# Patient Record
(demographics unavailable — no encounter records)

---

## 2017-09-01 NOTE — XR
EXAMINATION TYPE: XR chest 2V

 

DATE OF EXAM: 9/1/2017

 

COMPARISON: 11/15/2016

 

INDICATION: Pneumonia, cough

 

TECHNIQUE:  Frontal and lateral views of the chest are obtained.

 

FINDINGS:  

The heart size is normal.  

The pulmonary vasculature is normal.

The lungs are clear.

 

IMPRESSION:  

1. No acute pulmonary process.

## 2018-01-05 NOTE — ED
General Adult HPI





- General


Chief complaint: Upper Respiratory Infection


Stated complaint: Fever, nauseated


Time Seen by Provider: 18 09:34


Source: patient, RN notes reviewed


Mode of arrival: ambulatory


Limitations: no limitations





- History of Present Illness


Initial comments: 





31 yo female presents to the ER with cc of cough cold like symptoms.  She 

states she's been sick since yesterday.  She denies a high fever.  She states 

she's had a lot of congestion and drainage.  She states that she was concerned 

because she just is not feeling much better so she thought that she should be 

evaluated.  She denies any vomiting with this. Patient denies any recent 

shortness of breath, chest pain, back pain, abdominal pain, vomiting, numbness 

or tingling, dysuria or hematuria, constipation or diarrhea, headaches or 

visual changes, or any other current symptoms.





- Related Data


 Home Medications











 Medication  Instructions  Recorded  Confirmed


 


Acetaminophen [Tylenol] 650 mg PO Q6H PRN 18


 


Albuterol Inhaler [Ventolin Hfa 1 - 2 puff INHALATION RT-QID PRN 18





Inhaler]   


 


Azithromycin [Zithromax Z-pack] 0 mg PO AS DIRECTED 18


 


Bisoprolol-Hctz 5-6.25 mg [Ziac 1 tab PO DAILY 18





5-6.25]   


 


Montelukast [Singulair] 10 mg PO HS 18











 Allergies











Allergy/AdvReac Type Severity Reaction Status Date / Time


 


No Known Allergies Allergy   Verified 18 10:00














Review of Systems


ROS Statement: 


Those systems with pertinent positive or pertinent negative responses have been 

documented in the HPI.





ROS Other: All systems not noted in ROS Statement are negative.





Past Medical History


Past Medical History: No Reported History, Hypertension, Supraventricular 

Tachycardia (SVT)


Additional Past Medical History / Comment(s): tachycardia, svt


History of Any Multi-Drug Resistant Organisms: None Reported


Past Surgical History:  Section


Past Anesthesia/Blood Transfusion Reactions: No Reported Reaction


Past Psychological History: No Psychological Hx Reported


Smoking Status: Former smoker


Past Alcohol Use History: Occasional


Past Drug Use History: None Reported





- Past Family History


  ** Mother


Family Medical History: No Reported History





General Exam





- General Exam Comments


Initial Comments: 





General exam: Alert, active, comfortable in no apparent distress


Head: Normocephalic 


Eyes: Normal reaction of pupils, equal size, normal range of extraocular motion


Ears: normal external ear canals, pink tympanic membranes with normal cone of 

light


Nose: clear with pink turbinates


Throat: no erythema or exudates with normal sized tonsils


Neck: no masses, no nuchal rigidity


Chest: no chest wall deformity


Lungs: equal air entry with no crackles or wheeze


CVS: S1 and S2 normal with no audible mumurs, regular rhythm


Abdomen: no hepatosplenomegaly, normal  bowel sounds, no guarding or rigidity


Spine: no scoliosis or deformity


Skin: no rashes


Neurological: No focal deficits, tone is normal in all 4 extremities


Limitations: no limitations





Course


 Vital Signs











  18





  09:28 09:50 10:04


 


Temperature 97.4 F L  98.8 F


 


Pulse Rate 114 H  


 


Respiratory 18 20 





Rate   


 


Blood Pressure 143/77  


 


O2 Sat by Pulse 100  





Oximetry   














Medical Decision Making





- Medical Decision Making





32-year-old female presents to the emergency department with a chief complaint 

of cough cold like symptoms.  At this time the patient is positive for 

influenza A.  At this time we we'll give her prescription for Tamiflu.  We 

discussed Motrin and Tylenol.  We discussed return parameters and follow-up and 

all questions.  Patient stated that she understood and she is agreement plan.  

All cushions have been answered.  She'll be discharged.





- Lab Data


 Lab Results











  18 Range/Units





  09:55 09:55 


 


Influenza Type A RNA  Detected H   (Not Detectd)  


 


Influenza Type B (PCR)  Not Detected   (Not Detectd)  


 


Group A Strep Rapid   Negative  (Negative)  














- Radiology Data


Radiology results: report reviewed, image reviewed





Disposition


Clinical Impression: 


 Influenza A





Disposition: HOME SELF-CARE


Condition: Stable


Instructions:  Influenza in Children (ED)


Additional Instructions: 


Please use medication as discussed. Please follow up with family doctor if 

symptoms have not improved over the next two days. Please return to the 

emergency room if your symptoms increase or worsen or for any other concerns. 


Referrals: 


Alexey Sun MD [Primary Care Provider] - 1-2 days


Time of Disposition: 10:38

## 2018-03-04 NOTE — ED
Lower Extremity Injury HPI





- General


Stated Complaint: left ankle injury


Time Seen by Provider: 18 14:54


Source: patient, RN notes reviewed


Mode of arrival: wheelchair


Limitations: no limitations





- History of Present Illness


Initial Comments: 





33-year-old female presents emergency Department chief complaint of left ankle 

injury.  Patient states that she was getting off a trampoline states her ankle 

rolled she felt a snap.  Patient has pain in her tib-fib, left ankle region.  

Patient said no prior fractures.  Denies any paresthesias.  Patient states that 

she has not taken any medications prior arrival.





- Related Data


 Home Medications











 Medication  Instructions  Recorded  Confirmed


 


Bisoprolol-Hctz 5-6.25 mg [Ziac 1 tab PO DAILY 18





5-6.25]   


 


Montelukast [Singulair] 10 mg PO HS 18








 Previous Rx's











 Medication  Instructions  Recorded


 


Acetaminophen-Codeine 300-30mg 1 tab PO Q4H PRN #20 tablet 18





[Tylenol #3]  


 


Ibuprofen [Motrin] 600 mg PO Q8HR PRN #30 tab 18











 Allergies











Allergy/AdvReac Type Severity Reaction Status Date / Time


 


No Known Allergies Allergy   Verified 18 15:14














Review of Systems


ROS Statement: 


Those systems with pertinent positive or pertinent negative responses have been 

documented in the HPI.





ROS Other: All systems not noted in ROS Statement are negative.





Past Medical History


Past Medical History: No Reported History, Hypertension, Supraventricular 

Tachycardia (SVT)


Additional Past Medical History / Comment(s): tachycardia, svt


History of Any Multi-Drug Resistant Organisms: None Reported


Past Surgical History:  Section


Past Anesthesia/Blood Transfusion Reactions: No Reported Reaction


Past Psychological History: No Psychological Hx Reported


Smoking Status: Former smoker


Past Alcohol Use History: Occasional


Past Drug Use History: None Reported





- Past Family History


  ** Mother


Family Medical History: No Reported History





General Exam


Limitations: no limitations


General appearance: alert, in no apparent distress


Head exam: Present: atraumatic, normocephalic, normal inspection


Respiratory exam: Present: normal lung sounds bilaterally.  Absent: respiratory 

distress, wheezes, rales, rhonchi, stridor


Cardiovascular Exam: Present: regular rate, normal rhythm, normal heart sounds.

  Absent: systolic murmur, diastolic murmur, rubs, gallop, clicks


Extremities exam: Present: other (Left ankle there is obvious deformity, 

swelling noted greatest to lateral malleolus.,  There is mild tenderness the 

proximal tib-fib region.  Pulses equal bilaterally no discoloration)


Neurological exam: Present: reflexes normal.  Absent: motor sensory deficit


Skin exam: Present: warm, dry, intact, normal color.  Absent: rash





Course


 Vital Signs











  18





  14:59


 


Temperature 98.3 F


 


Pulse Rate 107 H


 


Respiratory 18





Rate 


 


Blood Pressure 159/112


 


O2 Sat by Pulse 97





Oximetry 














- Reevaluation(s)


Reevaluation #1: 





18 15:00


Patient was offered oral versus IM pain medication.  Patient opted for Tylenol 

codeine she states that she get sick from other meds.





Procedures





- Orthopedic Splinting/Casting


  ** Injury #1


Side: left


Lower Extremity Injury Location: short leg, ankle


Lower Extremity Immobilizer: posterior splint, synthetic pre-padded splint


Other Orthopedic Equipment: crutches





Medical Decision Making





- Medical Decision Making





33-year-old female presents for chief complaint left ankle injury.  There is no 

acute fracture dislocation.  Patient has a left ankle sprain concern for 

ligament disruption.  Patient was splinted will follow-up with orthopedics and 

return parameters were discussed.





Disposition


Clinical Impression: 


 Left ankle sprain





Disposition: HOME SELF-CARE


Condition: Stable


Instructions:  Ankle Sprain (ED)


Additional Instructions: 


Please return to the Emergency Department if symptoms worsen or any other 

concerns.


Prescriptions: 


Acetaminophen-Codeine 300-30mg [Tylenol #3] 1 tab PO Q4H PRN #20 tablet


 PRN Reason: pain


Ibuprofen [Motrin] 600 mg PO Q8HR PRN #30 tab


 PRN Reason: Pain


Referrals: 


Alexey Sun MD [Primary Care Provider] - 1-2 days


Jadon Ball MD [STAFF PHYSICIAN] - 1-2 days


Time of Disposition: 15:35

## 2018-03-04 NOTE — XR
EXAMINATION TYPE: XR ankle complete LT

 

DATE OF EXAM: 3/4/2018

 

COMPARISON: 2/24/2016

 

HISTORY: Pain

 

TECHNIQUE: 3 views

 

FINDINGS: There is soft tissue swelling over the lateral malleolus. Ankle mortise is anatomic. I see 
no fracture. Joint spaces are fairly normal.

 

IMPRESSION: Soft tissue swelling. No fracture seen.

## 2018-03-04 NOTE — XR
EXAMINATION TYPE: XR tibia fibula LT

 

DATE OF EXAM: 3/4/2018

 

COMPARISON: NONE

 

HISTORY: Pain

 

TECHNIQUE: 2 views

 

FINDINGS: I see no fracture nor dislocation. Tibia and fibula appear intact. There is soft tissue swe
lling over the distal fibula.

 

IMPRESSION: Soft tissue swelling. No fracture seen.

## 2018-03-20 NOTE — US
EXAMINATION TYPE: US venous doppler duplex LE LT

 

DATE OF EXAM: 3/20/2018 3:45 PM

 

COMPARISON: NONE

 

CLINICAL HISTORY: M25.572 PAIN IN LT ANKLE AND JOINT LT FOOT. Left ankle swelling-- Patient fell on a
nkle while on trampoline. On aspirin. 

 

SIDE PERFORMED: Left  

 

TECHNIQUE:  The lower extremity deep venous system is examined utilizing real time linear array sonog
sanju with graded compression, doppler sonography and color-flow sonography.

 

VESSELS IMAGED:

External Iliac Vein (EIV)

Common Femoral Vein

Deep Femoral Vein

Greater Saphenous Vein *

Femoral Vein

Popliteal Vein

Small Saphenous Vein *

Proximal Calf Veins

(* superficial vessels)

 

 

 

Left Leg:  Negative for DVT

 

Grayscale, color doppler, spectral doppler imaging performed of the deep veins of the left lower extr
emity.  There is normal flow, compressibility, vascular waveforms.

 

 

IMPRESSION:  No ultrasound evidence for acute DVT in the left lower extremity.

## 2018-06-14 NOTE — CT
EXAMINATION TYPE: CT ankle LT wo con

 

DATE OF EXAM: 6/14/2018

 

HISTORY: Left sided ankle injury 3 months ago.  pain since

 

COMPARISON: NONE

 

CT DLP: 240 mGycm.  Automated Exposure Control for Dose Reduction was Utilized.

 

TECHNIQUE:  CT scan of the left knee without intravenous contrast is performed.

 

FINDINGS:

The ankle mortise is maintained. There is no evidence of acute fracture or malalignment of the left a
nkle. Syndesmosis is unremarkable. Very small bone island is present within the cuboid. No suspicious
 osseous lesions are in normal osseous mineralization.

 

Evaluation of the ligaments and tendons as well as soft tissues are limited on CT, however there does
 appear to be prominent soft tissue density in the region of the deltoid ligament and slight attenuat
ion of the tibialis posterior and flexor digitorum longus. Extensor tendons appear unremarkable. Ther
e is a small tibiotalar joint effusion. Evaluation of the anterior talofibular ligament and posterior
 talofibular ligament are significantly limited on CT.

 

IMPRESSION:

1. No evidence of fracture or malalignment of the left ankle.

2. Although soft tissue components are limited on CT there appears to be prominent soft tissue within
 the deltoid ligament concerning for underlying tear and attenuation of the tibialis posterior as wel
l as flexor digitorum longus tendons suggesting either partial tear and/or tenosynovitis. Further keny
luation with MRI is recommended.

3. Small tibiotalar joint effusion, nonspecific.

## 2018-07-08 NOTE — XR
EXAMINATION TYPE: XR elbow complete LT

 

DATE OF EXAM: 7/8/2018

 

COMPARISON: NONE

 

HISTORY: Elbow pain

 

TECHNIQUE: 3 views

 

FINDINGS: I see no fracture nor dislocation. Joint spaces are normal. There is no sign of elbow joint
 effusion.

 

IMPRESSION: Negative left elbow exam.
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
## 2018-07-08 NOTE — XR
EXAMINATION TYPE: XR knee limited LT

 

DATE OF EXAM: 7/8/2018

 

COMPARISON: NONE

 

HISTORY: Trauma and pain

 

TECHNIQUE: 2 views

 

FINDINGS: Frontal and lateral views of the left knee were obtained and show no fracture nor dislocati
on. Joint spaces are fairly normal. There is no sign of joint effusion. There is overlying artifact.

 

 

 

IMPRESSION: Negative limited left knee exam.

## 2018-07-08 NOTE — CT
EXAMINATION TYPE: CT lower leg RT wo con

 

DATE OF EXAM: 7/8/2018

 

COMPARISON: None

 

HISTORY: Trauma

 

CT DLP: 1221.10 mGycm

Automated exposure control for dose reduction was used.

 

FINDINGS: 

Multiple axial sections were obtained from the level of the distal femur to the distal metatarsals wi
th no contrast.

 

FINDINGS:

There is soft tissue deformity on the anterior aspect of the distal tibia and the mid foot consistent
 with laceration. There are multiple opacities apparently due to numerous soft tissue foreign bodies.
 These are seen from the distal tibia to the level of the base of the first metatarsal. The distal ti
sneha and fibula appear intact. Talus is intact. Calcaneus is intact. Intertarsal joint spaces are well
-maintained. The visualized metatarsals appear intact. The knee joint appears normal.

 

IMPRESSION: 

LACERATION DEFORMITY OF THE ANTERIOR FOOT AND ANKLE WITH NUMEROUS FOREIGN BODIES. NO FRACTURE SEEN.

## 2018-07-08 NOTE — CT
EXAMINATION TYPE: CT ChestAbdPelvis w con

 

DATE OF EXAM: 7/8/2018

 

COMPARISON:

 

HISTORY: trauma

 

CT DLP: 763.40 mGycm

Automated exposure control for dose reduction was used.

 

CONTRAST: 

CT scan of the chest, abdomen and pelvis is performed without Oral Contrast and with IV Contrast, pat
ient injected with 100 mL of Isovue 300.

 

FINDINGS:

 

Multiple axial sections were obtained from the thoracic inlet to the floor the pelvis with the IV con
trast.

 

The lungs are clear of infiltrate. There is no sign of pleural effusion or pneumothorax. Heart size i
s normal. There is no pericardial effusion. Thoracic aorta is intact. There is no sign of aneurysm or
 dissection. There are no hilar masses. There is no mediastinal adenopathy.

 

Liver's plain appear normal. There is no pancreatic mass. There is high attenuation in the dependent 
gallbladder that could be a gallstone. Bile ducts are not dilated. There is no adrenal mass. Kidneys 
show satisfactory contrast opacification. There is no hydronephrosis. There is no free fluid in the a
bdomen. There is no intestinal wall thickening. There are no dilated loops. There is some air in the 
urinary bladder. There is Henriquez catheter.

 

Uterus is anteverted. There is no compression fracture of the thoracic or lumbar spine. There is no t
horacic paraspinal mass. The ribs appear intact. Bony pelvis is intact.

 

 

IMPRESSION: Normal CT scan of the chest abdomen pelvis. No evidence of traumatic injury.
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
## 2018-10-01 NOTE — MR
EXAMINATION TYPE: MR knee RT wo con

 

DATE OF EXAM: 10/1/2018

 

COMPARISON: Outside x-ray dated 9/6/2018, MRI 7/1/2016

 

HISTORY: Pain in right knee

 

TECHNIQUE: Multiplanar, multisequence imaging of the right knee is performed without IV contrast.

 

FINDINGS: There is large areas of marrow edema involving the medial femoral condyle, head of the fibu
la, and proximal aspect of the lateral tibia. Marrow edema within the medial margin of the patella al
so noted.

 

Patellar cartilage maintained. Retinaculum intact. Patellar and quadriceps tendons intact.

 

Anterior cruciate and posterior cruciate ligaments intact. Medial collateral and lateral collateral l
igaments intact.

 

No diagnostic evidence of meniscal tear.

 

No joint effusion. No erosive changes. No evidence of a popliteal fossa cyst.

 

IMPRESSION:

1. Ligamentous or meniscal tear. However there are sizable areas of marrow edema involving the patell
a, head of the fibula, lateral proximal tibia, and medial femoral condyle correlate for bone contusio
n or edema. Correlate for recent history of trauma. On the T1 images there is a question of a nondisp
laced macrotrabecular fracture involving the head of the fibula, medial femoral condyle and along the
 metaphysis of the lateral margin of the tibia.

## 2018-10-29 NOTE — MR
EXAMINATION TYPE: MR shoulder RT wo con

 

DATE OF EXAM: 10/29/2018

 

COMPARISON: X-ray dated 9/6/2018

 

HISTORY: Pain in right shoulder

 

TECHNIQUE: Multiplanar, multisequence imaging of the right shoulder is performed without contrast.

 

FINDINGS:

 

Rotator Cuff: There is increased intrasubstance signal in the anterior fibers of the supraspinatus te
ndon compatible tendinosis. No diagnostic evidence of through thickness tear or retraction.

 

Acromioclavicular Joint: No evidence of impingement. AC joint distance maintained.

 

Glenohumeral Joint: Joint space preserved. No joint effusion. Glenohumeral ligaments appear intact.

 

Labrum: Truncation of the anterior superior labrum. There appears to be extension of abnormal signal 
posteriorly. Correlate for SLAP tear.

 

Biceps Tendon: The long head of biceps is in normal location within bicipital groove.

 

Bone marrow signal: No focal abnormal marrow signal is appreciated.

 

Other: No additional significant abnormality is appreciated.

 

IMPRESSION:

1. Correlate for SLAP tear

2. Supraspinatus mild tendinosis with no evidence of tear.